# Patient Record
Sex: FEMALE | Race: WHITE
[De-identification: names, ages, dates, MRNs, and addresses within clinical notes are randomized per-mention and may not be internally consistent; named-entity substitution may affect disease eponyms.]

---

## 2018-04-18 ENCOUNTER — HOSPITAL ENCOUNTER (OUTPATIENT)
Dept: HOSPITAL 33 - ED | Age: 48
Setting detail: OBSERVATION
LOS: 1 days | Discharge: HOME | End: 2018-04-19
Attending: FAMILY MEDICINE | Admitting: FAMILY MEDICINE
Payer: COMMERCIAL

## 2018-04-18 DIAGNOSIS — J45.901: Primary | ICD-10-CM

## 2018-04-18 LAB
ALBUMIN SERPL-MCNC: 4.7 G/DL (ref 3.5–5)
ALP SERPL-CCNC: 128 U/L (ref 38–126)
ALT SERPL-CCNC: 18 U/L (ref 0–35)
AMPHETAMINES UR QL: NEGATIVE
ANION GAP SERPL CALC-SCNC: 17.4 MEQ/L (ref 5–15)
AST SERPL QL: 24 U/L (ref 14–36)
BARBITURATES UR QL: NEGATIVE
BASOPHILS # BLD AUTO: 0.04 10*3/UL (ref 0–0.4)
BASOPHILS NFR BLD AUTO: 0.7 % (ref 0–0.4)
BENZODIAZ UR QL SCN: NEGATIVE
BILIRUB BLD-MCNC: 0.5 MG/DL (ref 0.2–1.3)
BUN SERPL-MCNC: 8 MG/DL (ref 7–17)
CALCIUM SPEC-MCNC: 10.3 MG/DL (ref 8.4–10.2)
CHLORIDE SERPL-SCNC: 101 MMOL/L (ref 98–107)
CO2 SERPL-SCNC: 24 MMOL/L (ref 22–30)
COCAINE UR QL SCN: NEGATIVE
CREAT SERPL-MCNC: 0.72 MG/DL (ref 0.52–1.04)
D DIMER BLD IA.RAPID-MCNC: 429.44 NG/ML (ref 215–500)
EOSINOPHIL # BLD AUTO: 0.07 10*3/UL (ref 0–0.5)
FLUAV AG NPH QL IA: NEGATIVE
FLUBV AG NPH QL IA: NEGATIVE
GLUCOSE SERPL-MCNC: 178 MG/DL (ref 74–106)
GRANULOCYTES # BLD AUTO: 4.32 10*3/UL (ref 1.4–6.9)
HCT VFR BLD AUTO: 42.4 % (ref 35–47)
HGB BLD-MCNC: 14.6 GM/DL (ref 12–16)
INR PPP: 1.11 (ref 0.8–3)
LYMPHOCYTES # SPEC AUTO: 1.21 10*3/UL (ref 1–4.6)
MCH RBC QN AUTO: 29.6 PG (ref 26–32)
MCHC RBC AUTO-ENTMCNC: 34.4 G/DL (ref 32–36)
METHADONE UR QL: NEGATIVE
MONOCYTES # BLD AUTO: 0.36 10*3/UL (ref 0–1.3)
NEUTROPHILS NFR BLD AUTO: 71.9 % (ref 36–66)
OPIATES UR QL: POSITIVE
PCP UR QL CFM>20 NG/ML: NEGATIVE
PLATELET # BLD AUTO: 307 K/MM3 (ref 150–450)
POTASSIUM SERPLBLD-SCNC: 3.7 MMOL/L (ref 3.5–5.1)
PROT SERPL-MCNC: 8.6 G/DL (ref 6.3–8.2)
RBC # BLD AUTO: 4.94 M/MM3 (ref 4.1–5.4)
RSV AG SPEC QL IA: NEGATIVE
SODIUM SERPL-SCNC: 139 MMOL/L (ref 137–145)
THC UR QL SCN: POSITIVE
WBC # BLD AUTO: 6 K/MM3 (ref 4–10.5)

## 2018-04-18 PROCEDURE — 85610 PROTHROMBIN TIME: CPT

## 2018-04-18 PROCEDURE — 93005 ELECTROCARDIOGRAM TRACING: CPT

## 2018-04-18 PROCEDURE — 83605 ASSAY OF LACTIC ACID: CPT

## 2018-04-18 PROCEDURE — 84484 ASSAY OF TROPONIN QUANT: CPT

## 2018-04-18 PROCEDURE — 71045 X-RAY EXAM CHEST 1 VIEW: CPT

## 2018-04-18 PROCEDURE — 93041 RHYTHM ECG TRACING: CPT

## 2018-04-18 PROCEDURE — 96360 HYDRATION IV INFUSION INIT: CPT

## 2018-04-18 PROCEDURE — 83036 HEMOGLOBIN GLYCOSYLATED A1C: CPT

## 2018-04-18 PROCEDURE — G0378 HOSPITAL OBSERVATION PER HR: HCPCS

## 2018-04-18 PROCEDURE — 82962 GLUCOSE BLOOD TEST: CPT

## 2018-04-18 PROCEDURE — 80053 COMPREHEN METABOLIC PANEL: CPT

## 2018-04-18 PROCEDURE — 96361 HYDRATE IV INFUSION ADD-ON: CPT

## 2018-04-18 PROCEDURE — 94640 AIRWAY INHALATION TREATMENT: CPT

## 2018-04-18 PROCEDURE — 87040 BLOOD CULTURE FOR BACTERIA: CPT

## 2018-04-18 PROCEDURE — 94760 N-INVAS EAR/PLS OXIMETRY 1: CPT

## 2018-04-18 PROCEDURE — 93268 ECG RECORD/REVIEW: CPT

## 2018-04-18 PROCEDURE — 96365 THER/PROPH/DIAG IV INF INIT: CPT

## 2018-04-18 PROCEDURE — 36000 PLACE NEEDLE IN VEIN: CPT

## 2018-04-18 PROCEDURE — 85379 FIBRIN DEGRADATION QUANT: CPT

## 2018-04-18 PROCEDURE — 36415 COLL VENOUS BLD VENIPUNCTURE: CPT

## 2018-04-18 PROCEDURE — 94150 VITAL CAPACITY TEST: CPT

## 2018-04-18 PROCEDURE — 87631 RESP VIRUS 3-5 TARGETS: CPT

## 2018-04-18 PROCEDURE — 80307 DRUG TEST PRSMV CHEM ANLYZR: CPT

## 2018-04-18 PROCEDURE — 99285 EMERGENCY DEPT VISIT HI MDM: CPT

## 2018-04-18 PROCEDURE — 85025 COMPLETE CBC W/AUTO DIFF WBC: CPT

## 2018-04-18 RX ADMIN — PROMETHAZINE HYDROCHLORIDE PRN MG: 25 INJECTION INTRAMUSCULAR; INTRAVENOUS at 21:22

## 2018-04-18 RX ADMIN — IPRATROPIUM BROMIDE AND ALBUTEROL SULFATE SCH ML: .5; 3 SOLUTION RESPIRATORY (INHALATION) at 15:22

## 2018-04-18 RX ADMIN — ACETAMINOPHEN PRN MG: 325 TABLET ORAL at 19:34

## 2018-04-18 RX ADMIN — IPRATROPIUM BROMIDE AND ALBUTEROL SULFATE SCH ML: .5; 3 SOLUTION RESPIRATORY (INHALATION) at 19:44

## 2018-04-18 RX ADMIN — IPRATROPIUM BROMIDE AND ALBUTEROL SULFATE SCH ML: .5; 3 SOLUTION RESPIRATORY (INHALATION) at 07:06

## 2018-04-18 RX ADMIN — MORPHINE SULFATE PRN MG: 2 INJECTION, SOLUTION INTRAMUSCULAR; INTRAVENOUS at 16:23

## 2018-04-18 RX ADMIN — ACETAMINOPHEN PRN MG: 325 TABLET ORAL at 07:06

## 2018-04-18 RX ADMIN — INSULIN ASPART PRN UNIT: 100 INJECTION, SOLUTION INTRAVENOUS; SUBCUTANEOUS at 16:00

## 2018-04-18 RX ADMIN — IPRATROPIUM BROMIDE AND ALBUTEROL SULFATE SCH ML: .5; 3 SOLUTION RESPIRATORY (INHALATION) at 23:39

## 2018-04-18 RX ADMIN — WATER SCH MG: 1 INJECTION INTRAMUSCULAR; INTRAVENOUS; SUBCUTANEOUS at 12:01

## 2018-04-18 RX ADMIN — ACETAMINOPHEN PRN MG: 325 TABLET ORAL at 15:27

## 2018-04-18 RX ADMIN — IPRATROPIUM BROMIDE AND ALBUTEROL SULFATE SCH ML: .5; 3 SOLUTION RESPIRATORY (INHALATION) at 10:53

## 2018-04-18 RX ADMIN — FAMOTIDINE SCH MG: 10 INJECTION INTRAVENOUS at 16:00

## 2018-04-18 RX ADMIN — MORPHINE SULFATE PRN MG: 2 INJECTION, SOLUTION INTRAMUSCULAR; INTRAVENOUS at 09:57

## 2018-04-18 RX ADMIN — INSULIN ASPART PRN UNIT: 100 INJECTION, SOLUTION INTRAVENOUS; SUBCUTANEOUS at 22:19

## 2018-04-18 RX ADMIN — WATER SCH MG: 1 INJECTION INTRAMUSCULAR; INTRAVENOUS; SUBCUTANEOUS at 18:08

## 2018-04-18 RX ADMIN — FAMOTIDINE SCH MG: 10 INJECTION INTRAVENOUS at 21:21

## 2018-04-18 NOTE — HP
CHIEF COMPLAINT:  Shortness of breath.



HISTORY OF PRESENT ILLNESS:  The patient is a 47 year-old white female who reports that 
she has been having intermittent chest pains, both sides of her ribs and to her back over 
the past few weeks. In the past couple of days she has noticed increasing shortness of 
breath. She did have problems with asthma in the past and has handheld inhaler but had run 
out of his medication. She apparently had taken a Z-Nagi for pneumonia in mid-March as 
well. 



PAST MEDICAL/SURGICAL HISTORY:  Diabetes mellitus type 2, depression. She had a 
cholecystectomy and  section. 



HOME MEDICATIONS: Includes Albuterol, Ambien.



ALLERGIES: HALDOL.  

 

PHYSICAL EXAMINATION: Currently reveals a well nourished, well developed 47 year-old white 
male in mild distress presently, feeling much better. Her vital signs on admission to the 
emergency room showed a temperature 97.5F, pulse 101, respiratory rate 26, blood pressure 
126/108. Her O2 saturation was 94% on 2 liters nasal cannula. 

HEENT:  Normocephalic, atraumatic. Pupils equal round reactive to light. Extraocular 
movements intact. Oropharynx is pink and moist.

NECK: Supple without lymphadenopathy, thyromegaly or JVD. 

CHEST:  Currently fairly clear. She had been wheezing initially on arrival to the 
emergency room and received nebulizer treatments including a one hour long treatment. She 
is currently wheeze-free.  

HEART: Regular rate and rhythm without murmurs, rubs or gallops.

ABDOMEN: Soft. No palpable masses were felt. 

EXTREMITIES:  Without clubbing, cyanosis or edema. 

NEUROLOGIC: The patient is alert and oriented x3 with no focal deficits noted. 



LAB DATA AND TESTS: Her labs showed a troponin less than 0.012. Her glucose was 170 
nonfasting. BUN 8, creatinine 0.72. Electrolytes were normal. Liver enzymes were normal as 
well. Her white blood cell count was 6,000, hemoglobin 14.6, PLT count 370,000. She had 
international normalized ratio of 1.11. D-dimer was not elevated. Lactic acid was in 
normal range of 1.2. The patient had an EKG showing essentially normal EKG. There is no 
chest x-ray presently on the chart record. We will check the patient for influenza A, B 
and respiratory syncytial virus if not already done. 



ASSESSMENT: She has been placed on Levaquin empirically and has already received 
Solu-Medrol. We will continue to give her this every six hours basis at 125 mg. She will 
receive nebulizer treatments on PRN basis for her shortness of breath. She is receiving IV 
fluid rehydration as well.

## 2018-04-18 NOTE — XRAY
Indication: Cough and dyspnea.



Comparison: July 19, 2010.



Portable chest again demonstrates normal heart and lungs.  Bony thorax intact.

## 2018-04-18 NOTE — ERPHSYRPT
- History of Present Illness


Time Seen by Provider: 18 04:18


Source: patient


Exam Limitations: clinical condition


Patient Subjective Stated Complaint: Pt arrives to ER with c/o SOB for past 3 

days with cough, vomiting from coughing, epigastric pain, sore ribs, back pain. 

States has asthma but has ran out of inhaler and does not take nebulizer. 

Finished Z-pack 10 days ago from Pneumonia in mid March. Has intermittent 

chills and sweats.


Triage Nursing Assessment: Pt wheezing throughout bilaterally with accessory 

muscle use and appears in distress. Walked in to ER without difficulty.


Physician History: 





PATIENT WITH A HISTORY OF ASTHMA ,  TYPE 2 DIABETIC, COMPLAINS OF DIFFICULTY 

BREATHING, NONPRODUCTIVE COUGH AND MARKED EXERTIONAL DYSPNEA.  DENIES FEVER OR 

CHILLS.


Timing/Duration: day(s)


Activities at Onset: activity


Severity of Dyspnea-Max: severe


Severity of Dyspnea-Current: severe


Possible Cause: occasional episodes


Modifying Factors: Improves With: coughing, deep breath, exertion


Associated Symptoms: cough, painful breathing


International travel in last 2 weeks: No


Allergies/Adverse Reactions: 








haloperidol [From Haldol] Allergy (Intermediate, Verified 18 04:22)


 MUSLCES HURT





Home Medications: 








Albuterol Sulfate [Ventolin Hfa] 2 puff OINH Q4-6HPRN PRN 18 [History]


Zolpidem Tartrate [Zolpidem Tartrate] 10 mg PO HS 18 [History]





Hx Tetanus, Diphtheria Vaccination/Date Given: No


Hx Influenza Vaccination/Date Given: No


Hx Pneumococcal Vaccination/Date Given: No





- Review of Systems


Constitutional: No Fever, No Chills


Eyes: No Symptoms


Ears, Nose, & Throat: No Symptoms


Respiratory: Cough, No Dyspnea


Cardiac: No Symptoms, No Chest Pain, No Edema, No Syncope


Abdominal/Gastrointestinal: No Symptoms, No Abdominal Pain, No Nausea, No 

Vomiting, No Diarrhea


Genitourinary Symptoms: No Symptoms, No Dysuria


Musculoskeletal: No Symptoms, No Back Pain, No Neck Pain


Skin: No Rash


Neurological: No Symptoms, No Dizziness, No Focal Weakness, No Sensory Changes


Psychological: No Symptoms


Endocrine: No Symptoms


All Other Systems: Reviewed and Negative





- Past Medical History


Pertinent Past Medical History: Yes


Respiratory History: Asthma


Endocrine Medical History: Diabetes Type II


Psycho-Social History: Depression


Female Reproductive Disorders: Other





- Past Surgical History


Past Surgical History: Yes


Gastrointestinal: Cholecystectomy


Female Surgical History:  Section





- Social History


Smoking Status: Never smoker


Exposure to second hand smoke: No


Drug Use: none


Patient Lives Alone: No





- Female History


Hx Pregnant Now: No





- Nursing Vital Signs


Nursing Vital Signs: 


 Initial Vital Signs











Temperature  97.5 F   18 04:09


 


Pulse Rate  101 H  18 04:09


 


Respiratory Rate  26 H  18 04:09


 


Blood Pressure  126/108   18 04:09


 


O2 Sat by Pulse Oximetry  2 L  18 04:09








 Pain Scale











Pain Intensity                 0

















- Physical Exam


General Appearance: moderate distress


Eye Exam: PERRL/EOMI


Neck Exam: normal inspection, supple


Respiratory Exam: chest tenderness, diminished breath sounds, wheezing


Cardiovascular/Chest Exam: normal heart sounds, regular rate/rhythm


Abdominal/Gastrointestinal Exam: soft, normal bowel sounds


Extremity Exam: non-tender, normal range of motion, normal inspection, no calf 

tenderness, no pedal edema


Peripheral Pulses Exam: carotid (R): 2+, carotid (L): 2+, femoral (R): 2+, 

femoral (L): 2+, dorsalis-pedis (R): 2+


Neurologic Exam: alert, oriented x 3, cooperative


Skin Exam: normal color, warm


Lymphatic Exam: adenopathy


**SpO2 Interpretation**: normal


SpO2: 94


Oxygen Delivery: Nasal Cannula





- Radiology Exams


  ** Chest


X-ray Interpretation: Interpreted by me, Negative (HYPERINFLATION,  NO 

INFILTRATES)


Ordered Tests: 


 Active Orders 24 hr











 Category Date Time Status


 


 Up Ad Nanci ROUTINE Activity  18 05:35 Ordered


 


 CO2 Monitoring CONTINUOUS Care  18 05:35 Ordered


 


 Call Admit Doctor for Orders ON ADMISSION Care  18 05:35 Ordered


 


 Cardiac Monitor STAT Care  18 04:23 Active


 


 Code Status Order ROUTINE Care  18 05:34 Ordered


 


 EKG-ER Only STAT Care  18 04:22 Active


 


 IV Care Q6H Care  18 05:34 Ordered


 


 IV Insertion STAT Care  18 04:22 Active


 


 Oxygen-ED Only NASAL CANNULA 2 lpm Care  18 04:22 Active


 


 Place in Observation ROUTINE Care  18 05:34 Ordered


 


 Telemetry ROUTINE Care  18 05:34 Ordered


 


 Vital Signs Q4H Care  18 05:34 Ordered


 


 1800 Calorie ADA Diet  18 Breakfast Ordered


 


 CHEST 1 VIEW (PORTABLE) Stat Exams  18 04:23 Taken


 


 BLOOD CULTURE Stat Lab  18 05:00 Received


 


 CBC W DIFF Stat Lab  18 04:20 Completed


 


 CMP Stat Lab  18 04:20 Completed


 


 D-DIMER QUANTITATION Stat Lab  18 04:20 Completed


 


 Lactic Acid Stat Lab  18 04:22 Completed


 


 PROTIME WITH INR Stat Lab  18 04:20 Completed


 


 TROPONIN Q3H Lab  18 04:20 Completed


 


 TROPONIN Q3H Lab  18 07:30 Ordered


 


 TROPONIN Q3H Lab  18 10:30 Ordered


 


 TROPONIN Q3H Lab  18 13:30 Ordered


 


 TROPONIN Q3H Lab  18 16:30 Ordered


 


 Urine Triage Profile Stat Lab  18 04:32 Ordered


 


 Oxygen NASAL CANNULA 2 lpm RT  18 05:34 Ordered


 


 Respiratory Nebulizer STAT RT  18 04:24 Completed


 


 neb [Respiratory Nebulizer] STAT RT  18 04:41 Completed


 


 Transfer Order Routine Transfer  18 Ordered








Medication Summary











Generic Name Dose Route Start Last Admin





  Trade Name Freq  PRN Reason Stop Dose Admin


 


Levofloxacin/Dextrose  750 mg in 150 mls @ 100 mls/hr  18 04:22  18 

04:46





  Levofloxacin 750mg/150ml D5w  IV  18 05:51  100 mls/hr





  STAT STA   Administration














Discontinued Medications














Generic Name Dose Route Start Last Admin





  Trade Name Freq  PRN Reason Stop Dose Admin


 


Albuterol Sulfate  10 mg  18 04:22  18 04:41





  Proventil 2.5 Mg/3 Ml Neb***  IH  18 04:23  10 mg





  STAT ONE   Administration


 


Albuterol Sulfate  Confirm  18 04:36  





  Proventil 2.5 Mg/3 Ml Neb***  Administered  18 04:37  





  Dose   





  10 mg   





  IH   





  .STK-MED ONE   


 


Albuterol/Ipratropium  Confirm  18 04:22  





  Duoneb 0.5-3 Mg/3 Ml Neb**  Administered  18 04:23  





  Dose   





  3 ml   





  IH   





  .STK-MED ONE   


 


Albuterol/Ipratropium  3 ml  04/18/18 04:22  18 04:26





  Duoneb 0.5-3 Mg/3 Ml Neb**  IH  18 04:23  3 ml





  STAT ONE   Administration


 


Sodium Chloride  1,000 mls @ 999 mls/hr  18 04:22  18 04:46





  Sodium Chloride 0.9% 1000 Ml  IV  18 05:22  999 mls/hr





  .Q1H1M STA   Administration


 


Sodium Chloride  Confirm  18 04:28  





  Sodium Chloride 0.9% 1000 Ml  Administered  18 04:29  





  Dose   





  1,000 mls @ ud   





  .ROUTE   





  .STK-MED ONE   


 


Levofloxacin/Dextrose  Confirm  18 04:28  





  Levofloxacin 750mg/150ml D5w  Administered  18 04:29  





  Dose   





  750 mg in 150 mls @ ud   





  IV   





  .STK-MED ONE   


 


Methylprednisolone Sodium Succinate  125 mg  18 04:22  18 04:46





  Solu-Medrol 125 Mg***  IV  18 04:23  125 mg





  STAT ONE   Administration


 


Methylprednisolone Sodium Succinate  Confirm  18 04:28  





  Solu-Medrol 125 Mg***  Administered  18 04:29  





  Dose   





  125 mg   





  .ROUTE   





  .STK-MED ONE   











Lab/Rad Data: 


 Laboratory Result Diagrams





 18 04:20 





 18 04:20 





 Laboratory Results











  18 Range/Units





  04:22 04:20 04:20 


 


WBC     (4.0-10.5)  K/mm3


 


RBC     (4.1-5.4)  M/mm3


 


Hgb     (12.0-16.0)  gm/dl


 


Hct     (35-47)  %


 


MCV     ()  fl


 


MCH     (26-32)  pg


 


MCHC     (32-36)  g/dl


 


RDW     (11.5-14.0)  %


 


Plt Count     (150-450)  K/mm3


 


MPV     (6-9.5)  fl


 


Gran %     (36.0-66.0)  %


 


Eos # (Auto)     (0-0.5)  


 


Absolute Lymphs (auto)     (1.0-4.6)  


 


Absolute Monos (auto)     (0.0-1.3)  


 


Lymphocytes %     (24.0-44.0)  %


 


Monocytes %     (0.0-12.0)  %


 


Eosinophils %     (0.00-5.0)  %


 


Basophils %     (0.0-0.4)  %


 


Absolute Granulocytes     (1.4-6.9)  


 


Basophils #     (0-0.4)  


 


PT    12.4 H  (9.95-12.35)  SECONDS


 


INR    1.11  (0.8-3.0)  


 


D-Dimer    429.44  (215-500)  ng/mL


 


Sodium     (137-145)  mmol/L


 


Potassium     (3.5-5.1)  mmol/L


 


Chloride     ()  mmol/L


 


Carbon Dioxide     (22-30)  mmol/L


 


Anion Gap     (5-15)  MEQ/L


 


BUN     (7-17)  mg/dL


 


Creatinine     (0.52-1.04)  mg/dL


 


Estimated GFR     ML/MIN


 


Glucose     ()  mg/dL


 


Lactic Acid  1.2    (0.4-2.0)  


 


Calcium     (8.4-10.2)  mg/dL


 


Total Bilirubin     (0.2-1.3)  mg/dL


 


AST     (14-36)  U/L


 


ALT     (0-35)  U/L


 


Alkaline Phosphatase     ()  U/L


 


Troponin I   < 0.012   (0.000-0.034)  ng/mL


 


Serum Total Protein     (6.3-8.2)  g/dL


 


Albumin     (3.5-5.0)  g/dL














  18 Range/Units





  04:20 04:20 


 


WBC   6.0  (4.0-10.5)  K/mm3


 


RBC   4.94  (4.1-5.4)  M/mm3


 


Hgb   14.6  (12.0-16.0)  gm/dl


 


Hct   42.4  (35-47)  %


 


MCV   85.8  ()  fl


 


MCH   29.6  (26-32)  pg


 


MCHC   34.4  (32-36)  g/dl


 


RDW   13.8  (11.5-14.0)  %


 


Plt Count   307  (150-450)  K/mm3


 


MPV   10.2 H  (6-9.5)  fl


 


Gran %   71.9 H  (36.0-66.0)  %


 


Eos # (Auto)   0.07  (0-0.5)  


 


Absolute Lymphs (auto)   1.21  (1.0-4.6)  


 


Absolute Monos (auto)   0.36  (0.0-1.3)  


 


Lymphocytes %   20.2 L  (24.0-44.0)  %


 


Monocytes %   6.0  (0.0-12.0)  %


 


Eosinophils %   1.2  (0.00-5.0)  %


 


Basophils %   0.7  (0.0-0.4)  %


 


Absolute Granulocytes   4.32  (1.4-6.9)  


 


Basophils #   0.04  (0-0.4)  


 


PT    (9.95-12.35)  SECONDS


 


INR    (0.8-3.0)  


 


D-Dimer    (215-500)  ng/mL


 


Sodium  139   (137-145)  mmol/L


 


Potassium  3.7   (3.5-5.1)  mmol/L


 


Chloride  101   ()  mmol/L


 


Carbon Dioxide  24   (22-30)  mmol/L


 


Anion Gap  17.4 H   (5-15)  MEQ/L


 


BUN  8   (7-17)  mg/dL


 


Creatinine  0.72   (0.52-1.04)  mg/dL


 


Estimated GFR  > 60.0   ML/MIN


 


Glucose  178 H   ()  mg/dL


 


Lactic Acid    (0.4-2.0)  


 


Calcium  10.3 H   (8.4-10.2)  mg/dL


 


Total Bilirubin  0.50   (0.2-1.3)  mg/dL


 


AST  24   (14-36)  U/L


 


ALT  18   (0-35)  U/L


 


Alkaline Phosphatase  128 H   ()  U/L


 


Troponin I    (0.000-0.034)  ng/mL


 


Serum Total Protein  8.6 H   (6.3-8.2)  g/dL


 


Albumin  4.7   (3.5-5.0)  g/dL














- Progress


Progress: re-examined, unchanged


Progress Note: 





18 04:39


ADMINISTERED A DUONEB AEROSOL TX FOLLOWED BY A ALBUTEROL 10MG UNIT DOSE 

CONTINUOUS AEROSOL TX.  IV BOLUS NORMAL SALINE 1 LITER/HR,  SOLUMEDROL 125MG IV

,  AFTER 2 SETS OF BLOOD CULTURES, LEVAQUIN 500MG IVPB,  LACTIC ACID 1.6








18 04:41





Blood Culture(s) Obtained: Yes


Antibiotics given: Yes


Discussed with Dr.: Mccormick (DISCUSSED WITH DR MCCORMICK AT 0525 FOR OBSERVATION)





- Departure


Time of Disposition: 05:40 (\)


Departure Disposition: Observation


Clinical Impression: 


 EXACERBATION COPD





Condition: Stable


Critical Care Time: No


Referrals: 


BARRY FARAH [Primary Care Provider] -

## 2018-04-19 VITALS — SYSTOLIC BLOOD PRESSURE: 135 MMHG | HEART RATE: 123 BPM | DIASTOLIC BLOOD PRESSURE: 59 MMHG

## 2018-04-19 VITALS — OXYGEN SATURATION: 95 %

## 2018-04-19 LAB
ALBUMIN SERPL-MCNC: 4.1 G/DL (ref 3.5–5)
ALP SERPL-CCNC: 89 U/L (ref 38–126)
ALT SERPL-CCNC: 15 U/L (ref 0–35)
ANION GAP SERPL CALC-SCNC: 14.7 MEQ/L (ref 5–15)
AST SERPL QL: 21 U/L (ref 14–36)
BASOPHILS # BLD AUTO: 0.01 10*3/UL (ref 0–0.4)
BASOPHILS NFR BLD AUTO: 0 % (ref 0–0.4)
BILIRUB BLD-MCNC: 0.1 MG/DL (ref 0.2–1.3)
BLD SMEAR INTERP: YES
BUN SERPL-MCNC: 12 MG/DL (ref 7–17)
CALCIUM SPEC-MCNC: 9.6 MG/DL (ref 8.4–10.2)
CHLORIDE SERPL-SCNC: 108 MMOL/L (ref 98–107)
CO2 SERPL-SCNC: 23 MMOL/L (ref 22–30)
CREAT SERPL-MCNC: 0.81 MG/DL (ref 0.52–1.04)
EOSINOPHIL # BLD AUTO: 0 10*3/UL (ref 0–0.5)
GLUCOSE SERPL-MCNC: 184 MG/DL (ref 74–106)
GRANULOCYTES # BLD AUTO: 20.45 10*3/UL (ref 1.4–6.9)
HCT VFR BLD AUTO: 36.3 % (ref 35–47)
HGB BLD-MCNC: 12.3 GM/DL (ref 12–16)
LYMPHOCYTES # SPEC AUTO: 0.84 10*3/UL (ref 1–4.6)
MCH RBC QN AUTO: 29.4 PG (ref 26–32)
MCHC RBC AUTO-ENTMCNC: 33.9 G/DL (ref 32–36)
MONOCYTES # BLD AUTO: 0.53 10*3/UL (ref 0–1.3)
NEUTROPHILS NFR BLD AUTO: 93.8 % (ref 36–66)
PLATELET # BLD AUTO: 313 K/MM3 (ref 150–450)
POTASSIUM SERPLBLD-SCNC: 3.4 MMOL/L (ref 3.5–5.1)
PROT SERPL-MCNC: 7.2 G/DL (ref 6.3–8.2)
RBC # BLD AUTO: 4.19 M/MM3 (ref 4.1–5.4)
SODIUM SERPL-SCNC: 143 MMOL/L (ref 137–145)
WBC # BLD AUTO: 21.8 K/MM3 (ref 4–10.5)

## 2018-04-19 RX ADMIN — ACETAMINOPHEN PRN MG: 325 TABLET ORAL at 00:23

## 2018-04-19 RX ADMIN — PROMETHAZINE HYDROCHLORIDE PRN MG: 25 INJECTION INTRAMUSCULAR; INTRAVENOUS at 04:15

## 2018-04-19 RX ADMIN — WATER SCH MG: 1 INJECTION INTRAMUSCULAR; INTRAVENOUS; SUBCUTANEOUS at 06:06

## 2018-04-19 RX ADMIN — MORPHINE SULFATE PRN MG: 2 INJECTION, SOLUTION INTRAMUSCULAR; INTRAVENOUS at 06:06

## 2018-04-19 RX ADMIN — WATER SCH MG: 1 INJECTION INTRAMUSCULAR; INTRAVENOUS; SUBCUTANEOUS at 00:12

## 2018-04-19 RX ADMIN — FAMOTIDINE SCH: 10 INJECTION INTRAVENOUS at 09:37

## 2018-04-19 RX ADMIN — IPRATROPIUM BROMIDE AND ALBUTEROL SULFATE SCH ML: .5; 3 SOLUTION RESPIRATORY (INHALATION) at 03:38

## 2018-04-19 RX ADMIN — IPRATROPIUM BROMIDE AND ALBUTEROL SULFATE SCH ML: .5; 3 SOLUTION RESPIRATORY (INHALATION) at 07:11

## 2018-04-19 NOTE — PCM.DCORD
- Discharge


Discharge Date: 04/19/18


Disposition: Home, Self-Care


Prescriptions: 


New


   Levofloxacin [Levaquin] 500 mg PO DAILY #7 tablet


   Prednisone 10 mg PO DAILY #18 tablet


   Albuterol 2.5 mg/3 ml Neb*** [Proventil 2.5 mg/3 ml Neb***] 2.5 mg IH Q4H 

PRN PRN #90 neb


     PRN Reason: Shortness Of Breath





Continue


   Zolpidem Tartrate 10 mg PO HS


   Albuterol Sulfate [Ventolin Hfa] 2 puff OINH Q4-6HPRN PRN


     PRN Reason: Shortness Of Breath/Wheezing


Follow up with: 


BARRY FARAH [Primary Care Provider] - 1 Week


AMADOU MCCURDY [ACTIVE STAFF] - 1 Week

## 2019-12-10 ENCOUNTER — HOSPITAL ENCOUNTER (OUTPATIENT)
Dept: HOSPITAL 33 - SDC | Age: 49
Discharge: HOME | End: 2019-12-10
Attending: OBSTETRICS & GYNECOLOGY
Payer: MEDICAID

## 2019-12-10 VITALS — DIASTOLIC BLOOD PRESSURE: 108 MMHG | HEART RATE: 81 BPM | OXYGEN SATURATION: 81 % | SYSTOLIC BLOOD PRESSURE: 166 MMHG

## 2019-12-10 DIAGNOSIS — N84.0: ICD-10-CM

## 2019-12-10 DIAGNOSIS — N95.0: Primary | ICD-10-CM

## 2019-12-10 PROCEDURE — 58563 HYSTEROSCOPY ABLATION: CPT

## 2019-12-10 PROCEDURE — 36415 COLL VENOUS BLD VENIPUNCTURE: CPT

## 2019-12-10 PROCEDURE — 88305 TISSUE EXAM BY PATHOLOGIST: CPT

## 2019-12-10 PROCEDURE — 81025 URINE PREGNANCY TEST: CPT

## 2019-12-11 NOTE — OP
SURGERY DATE/TIME:   12/10/2019  1023



PREOPERATIVE DIAGNOSIS:     Postmenopausal bleeding.



POSTOPERATIVE DIAGNOSIS:     Postmenopausal bleeding and endometrial polyp.



PROCEDURE:  Hysteroscopy, D&C with resection of endometrial polyp and subsequent Novasure 
endometrial ablation.  



SURGEON:        Reji Castro D.O.



ASSISTANT:      Surgical tech. 



ANESTHESIA:    General. 



ESTIMATED BLOOD LOSS:  Minimal. 



COMPLICATIONS:  None. 



INDICATIONS: The risks, benefits, indications and alternatives of the procedure were 
reviewed with the patient prior to procedure. The patient understood the risk of 
infection, bleeding, bowel injury, bladder injury, ureteral injury, uterine perforation 
associated with the surgery. However desires to have this surgery as a possible need to 
alleviate her current medical condition. 

      

DESCRIPTION OF PROCEDURE AND FINDINGS:  At this point the patient is taken to the 
operating room, given general sedation, placed in dorsal lithotomy position. Prepped and 
draped in usual sterile fashion. A weighted speculum is then placed in the patient's 
vagina and the anterior lip of the cervix grasped with a single tooth tenaculum. 
Endocervical dilators are advanced to the endocervical canal as a means to dilate the 
cervix and at this point a 5 mm hysteroscope was then placed into the endocervical canal 
where visualization revealed her to have an endometrial polypoid lesion approximately 2 x 
2 cm extending from the frontal region more distally towards the endocervical canal. From 
this point the MyoSure was then used and was placed through the channel of the 
hysteroscope and was used to resect the polypoid lesion and was done so without 
complication. There was no bleeding that was noted.  The remainder of the uterine cavity 
appeared to be within normal limits. From this point the hysteroscope was then removed and 
a curette was then placed into the fundus of the uterus where curettage was performed in 
all quadrants of the uterus retrieving a mild to moderate amount of tissue. At this point 
hemostasis was obtained. After removal of the curette, the Novasure was then grasped and 
was set to a length of 6.5 cm and a width of 4.0 cm and was engaged into the uterine 
cavity where it was retracted approximately 1 cm from the fundal region and was at this 
point engaged and the machine was turned for an ablative time of approximately 1 minute 
time. At the completion of the ablation the Novasure was disengaged and was removed from 
the uterine cavity. From this point, all subsequent instruments were then removed from the 
patient's vaginal region. The patient was then taken out of anesthesia and then taken to 
the recovery room in stable condition. All instruments and laps were accounted for x2.